# Patient Record
Sex: FEMALE | URBAN - METROPOLITAN AREA
[De-identification: names, ages, dates, MRNs, and addresses within clinical notes are randomized per-mention and may not be internally consistent; named-entity substitution may affect disease eponyms.]

---

## 2019-06-10 ENCOUNTER — OFFICE VISIT (OUTPATIENT)
Dept: URBAN - METROPOLITAN AREA CLINIC 29 | Facility: CLINIC | Age: 84
End: 2019-06-10
Payer: COMMERCIAL

## 2019-06-10 PROCEDURE — 92004 COMPRE OPH EXAM NEW PT 1/>: CPT | Performed by: OPTOMETRIST

## 2019-06-10 PROCEDURE — 92134 CPTRZ OPH DX IMG PST SGM RTA: CPT | Performed by: OPTOMETRIST

## 2019-06-10 ASSESSMENT — INTRAOCULAR PRESSURE
OD: 18
OS: 18

## 2019-06-10 NOTE — IMPRESSION/PLAN
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 Plan: Discussed diagnosis in detail with patient. Discussed treatment options with patient. Use of vitamins has shown to improve the effects of ARMD.  Use of [employer] grid was explained. Will continue to observe condition and or symptoms. Discussed risks of progression. Call if [address] worsens.

## 2019-07-02 ENCOUNTER — OFFICE VISIT (OUTPATIENT)
Dept: URBAN - METROPOLITAN AREA CLINIC 29 | Facility: CLINIC | Age: 84
End: 2019-07-02

## 2019-07-02 ASSESSMENT — VISUAL ACUITY
OD: 20/25
OS: 20/30

## 2020-11-03 ENCOUNTER — OFFICE VISIT (OUTPATIENT)
Dept: URBAN - METROPOLITAN AREA CLINIC 29 | Facility: CLINIC | Age: 85
End: 2020-11-03
Payer: COMMERCIAL

## 2020-11-03 DIAGNOSIS — H35.443 BILATERAL AGE-RELATED RETICULAR DEGENERATION OF RETINAS: Primary | ICD-10-CM

## 2020-11-03 DIAGNOSIS — H52.223 REGULAR ASTIGMATISM, BILATERAL: ICD-10-CM

## 2020-11-03 PROCEDURE — 92014 COMPRE OPH EXAM EST PT 1/>: CPT | Performed by: OPTOMETRIST

## 2020-11-03 ASSESSMENT — INTRAOCULAR PRESSURE
OD: 17
OS: 17

## 2020-11-03 ASSESSMENT — VISUAL ACUITY
OD: 20/30
OS: 20/40

## 2020-11-03 NOTE — IMPRESSION/PLAN
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 Condition: established, stable. Plan: Discussed diagnosis in detail with patient. Discussed treatment options with patient. Use of vitamins has shown to improve the effects of ARMD.  Use of [employer]ler grid was explained. Will continue to observe condition and or symptoms. Discussed risks of progression. Call if [address] worsens.